# Patient Record
Sex: MALE | Race: WHITE | NOT HISPANIC OR LATINO
[De-identification: names, ages, dates, MRNs, and addresses within clinical notes are randomized per-mention and may not be internally consistent; named-entity substitution may affect disease eponyms.]

---

## 2017-09-06 ENCOUNTER — APPOINTMENT (OUTPATIENT)
Dept: DERMATOLOGY | Facility: CLINIC | Age: 47
End: 2017-09-06
Payer: COMMERCIAL

## 2017-09-06 VITALS — WEIGHT: 235 LBS | HEIGHT: 75 IN | BODY MASS INDEX: 29.22 KG/M2

## 2017-09-06 DIAGNOSIS — F41.9 ANXIETY DISORDER, UNSPECIFIED: ICD-10-CM

## 2017-09-06 DIAGNOSIS — Z80.8 FAMILY HISTORY OF MALIGNANT NEOPLASM OF OTHER ORGANS OR SYSTEMS: ICD-10-CM

## 2017-09-06 PROCEDURE — 99213 OFFICE O/P EST LOW 20 MIN: CPT

## 2018-07-26 PROBLEM — Z80.8 FAMILY HISTORY OF BASAL CELL CARCINOMA: Status: ACTIVE | Noted: 2017-09-06

## 2018-09-14 ENCOUNTER — APPOINTMENT (OUTPATIENT)
Dept: DERMATOLOGY | Facility: CLINIC | Age: 48
End: 2018-09-14
Payer: COMMERCIAL

## 2018-09-14 VITALS — HEIGHT: 75 IN | WEIGHT: 235 LBS | BODY MASS INDEX: 29.22 KG/M2

## 2018-09-14 PROCEDURE — 99213 OFFICE O/P EST LOW 20 MIN: CPT

## 2019-09-13 ENCOUNTER — APPOINTMENT (OUTPATIENT)
Dept: DERMATOLOGY | Facility: CLINIC | Age: 49
End: 2019-09-13
Payer: COMMERCIAL

## 2019-09-13 VITALS — HEIGHT: 75 IN | BODY MASS INDEX: 29.22 KG/M2 | WEIGHT: 235 LBS

## 2019-09-13 DIAGNOSIS — Z00.00 ENCOUNTER FOR GENERAL ADULT MEDICAL EXAMINATION W/OUT ABNORMAL FINDINGS: ICD-10-CM

## 2019-09-13 PROCEDURE — 99213 OFFICE O/P EST LOW 20 MIN: CPT

## 2019-09-13 NOTE — ASSESSMENT
[FreeTextEntry1] : Complete skin examination is negative for malignancy;\par Continue regular exams; Hx BCC

## 2019-09-13 NOTE — HISTORY OF PRESENT ILLNESS
[de-identified] : Pt. presents for skin check;\par No itching, bleeding, growing, changing lesions noted;\par Severity:  mild  \par Modifying factors:  none\par Associated symptoms:  none\par Context:  no association with activity \par

## 2019-09-13 NOTE — PHYSICAL EXAM
[Full Body Skin Exam Performed] : performed [FreeTextEntry3] : Skin examination performed of the face, neck, trunk, arms, legs; \par The patient is well, alert and oriented, pleasant and cooperative.\par Eyelids, conjunctivae, oral mucosa, digits and nails all normal.  \par No cervical adenopathy.\par \par Normal findings include:\par \par Multiple benign nevi were noted. \par Angiomas\par Lentigines\par Healed BCC scar L lateral chest near axilla\par Scaling waxy stuck on papule; L upper back\par \par No lesions were suspicious for malignancy. \par \par

## 2019-12-10 ENCOUNTER — INPATIENT (INPATIENT)
Facility: HOSPITAL | Age: 49
LOS: 1 days | Discharge: HOME | DRG: 347 | End: 2019-12-12
Attending: HOSPITALIST | Admitting: HOSPITALIST
Payer: COMMERCIAL

## 2019-12-10 VITALS
HEART RATE: 60 BPM | DIASTOLIC BLOOD PRESSURE: 73 MMHG | HEIGHT: 75 IN | SYSTOLIC BLOOD PRESSURE: 119 MMHG | OXYGEN SATURATION: 97 % | WEIGHT: 235.89 LBS | RESPIRATION RATE: 18 BRPM | TEMPERATURE: 98 F

## 2019-12-10 DIAGNOSIS — M54.9 DORSALGIA, UNSPECIFIED: ICD-10-CM

## 2019-12-10 LAB
ANION GAP SERPL CALC-SCNC: 4 MMOL/L — LOW (ref 5–17)
BASOPHILS # BLD AUTO: 0.02 K/UL — SIGNIFICANT CHANGE UP (ref 0–0.2)
BASOPHILS NFR BLD AUTO: 0.4 % — SIGNIFICANT CHANGE UP (ref 0–2)
BUN SERPL-MCNC: 24 MG/DL — HIGH (ref 7–23)
CALCIUM SERPL-MCNC: 9 MG/DL — SIGNIFICANT CHANGE UP (ref 8.5–10.1)
CHLORIDE SERPL-SCNC: 108 MMOL/L — SIGNIFICANT CHANGE UP (ref 96–108)
CO2 SERPL-SCNC: 26 MMOL/L — SIGNIFICANT CHANGE UP (ref 22–31)
CREAT SERPL-MCNC: 1.28 MG/DL — SIGNIFICANT CHANGE UP (ref 0.5–1.3)
EOSINOPHIL # BLD AUTO: 0.02 K/UL — SIGNIFICANT CHANGE UP (ref 0–0.5)
EOSINOPHIL NFR BLD AUTO: 0.4 % — SIGNIFICANT CHANGE UP (ref 0–6)
GLUCOSE SERPL-MCNC: 152 MG/DL — HIGH (ref 70–99)
HCT VFR BLD CALC: 47.1 % — SIGNIFICANT CHANGE UP (ref 39–50)
HGB BLD-MCNC: 15.8 G/DL — SIGNIFICANT CHANGE UP (ref 13–17)
IMM GRANULOCYTES NFR BLD AUTO: 0.4 % — SIGNIFICANT CHANGE UP (ref 0–1.5)
LYMPHOCYTES # BLD AUTO: 0.77 K/UL — LOW (ref 1–3.3)
LYMPHOCYTES # BLD AUTO: 14.1 % — SIGNIFICANT CHANGE UP (ref 13–44)
MCHC RBC-ENTMCNC: 30 PG — SIGNIFICANT CHANGE UP (ref 27–34)
MCHC RBC-ENTMCNC: 33.5 GM/DL — SIGNIFICANT CHANGE UP (ref 32–36)
MCV RBC AUTO: 89.4 FL — SIGNIFICANT CHANGE UP (ref 80–100)
MONOCYTES # BLD AUTO: 0.16 K/UL — SIGNIFICANT CHANGE UP (ref 0–0.9)
MONOCYTES NFR BLD AUTO: 2.9 % — SIGNIFICANT CHANGE UP (ref 2–14)
NEUTROPHILS # BLD AUTO: 4.46 K/UL — SIGNIFICANT CHANGE UP (ref 1.8–7.4)
NEUTROPHILS NFR BLD AUTO: 81.8 % — HIGH (ref 43–77)
PLATELET # BLD AUTO: 145 K/UL — LOW (ref 150–400)
POTASSIUM SERPL-MCNC: 4.2 MMOL/L — SIGNIFICANT CHANGE UP (ref 3.5–5.3)
POTASSIUM SERPL-SCNC: 4.2 MMOL/L — SIGNIFICANT CHANGE UP (ref 3.5–5.3)
RBC # BLD: 5.27 M/UL — SIGNIFICANT CHANGE UP (ref 4.2–5.8)
RBC # FLD: 12.1 % — SIGNIFICANT CHANGE UP (ref 10.3–14.5)
SODIUM SERPL-SCNC: 138 MMOL/L — SIGNIFICANT CHANGE UP (ref 135–145)
WBC # BLD: 5.45 K/UL — SIGNIFICANT CHANGE UP (ref 3.8–10.5)
WBC # FLD AUTO: 5.45 K/UL — SIGNIFICANT CHANGE UP (ref 3.8–10.5)

## 2019-12-10 PROCEDURE — 72100 X-RAY EXAM L-S SPINE 2/3 VWS: CPT | Mod: 26

## 2019-12-10 PROCEDURE — 72148 MRI LUMBAR SPINE W/O DYE: CPT

## 2019-12-10 PROCEDURE — 36415 COLL VENOUS BLD VENIPUNCTURE: CPT

## 2019-12-10 PROCEDURE — 93010 ELECTROCARDIOGRAM REPORT: CPT

## 2019-12-10 PROCEDURE — 80048 BASIC METABOLIC PNL TOTAL CA: CPT

## 2019-12-10 PROCEDURE — 72148 MRI LUMBAR SPINE W/O DYE: CPT | Mod: 26

## 2019-12-10 RX ORDER — KETOROLAC TROMETHAMINE 30 MG/ML
60 SYRINGE (ML) INJECTION ONCE
Refills: 0 | Status: DISCONTINUED | OUTPATIENT
Start: 2019-12-10 | End: 2019-12-10

## 2019-12-10 RX ORDER — ACETAMINOPHEN 500 MG
650 TABLET ORAL EVERY 4 HOURS
Refills: 0 | Status: DISCONTINUED | OUTPATIENT
Start: 2019-12-10 | End: 2019-12-12

## 2019-12-10 RX ORDER — EFINACONAZOLE 100 MG/ML
1 SOLUTION TOPICAL
Qty: 0 | Refills: 0 | DISCHARGE

## 2019-12-10 RX ORDER — DIAZEPAM 5 MG
5 TABLET ORAL ONCE
Refills: 0 | Status: DISCONTINUED | OUTPATIENT
Start: 2019-12-10 | End: 2019-12-10

## 2019-12-10 RX ORDER — DEXAMETHASONE 0.5 MG/5ML
10 ELIXIR ORAL ONCE
Refills: 0 | Status: COMPLETED | OUTPATIENT
Start: 2019-12-10 | End: 2019-12-10

## 2019-12-10 RX ORDER — ONDANSETRON 8 MG/1
4 TABLET, FILM COATED ORAL EVERY 6 HOURS
Refills: 0 | Status: DISCONTINUED | OUTPATIENT
Start: 2019-12-10 | End: 2019-12-12

## 2019-12-10 RX ORDER — SENNA PLUS 8.6 MG/1
1 TABLET ORAL AT BEDTIME
Refills: 0 | Status: DISCONTINUED | OUTPATIENT
Start: 2019-12-10 | End: 2019-12-12

## 2019-12-10 RX ORDER — TADALAFIL 10 MG/1
0 TABLET, FILM COATED ORAL
Qty: 0 | Refills: 0 | DISCHARGE

## 2019-12-10 RX ORDER — MORPHINE SULFATE 50 MG/1
4 CAPSULE, EXTENDED RELEASE ORAL ONCE
Refills: 0 | Status: DISCONTINUED | OUTPATIENT
Start: 2019-12-10 | End: 2019-12-10

## 2019-12-10 RX ORDER — SODIUM CHLORIDE 9 MG/ML
1000 INJECTION INTRAMUSCULAR; INTRAVENOUS; SUBCUTANEOUS
Refills: 0 | Status: DISCONTINUED | OUTPATIENT
Start: 2019-12-10 | End: 2019-12-12

## 2019-12-10 RX ORDER — KETOROLAC TROMETHAMINE 30 MG/ML
30 SYRINGE (ML) INJECTION EVERY 6 HOURS
Refills: 0 | Status: DISCONTINUED | OUTPATIENT
Start: 2019-12-10 | End: 2019-12-12

## 2019-12-10 RX ORDER — PANTOPRAZOLE SODIUM 20 MG/1
40 TABLET, DELAYED RELEASE ORAL
Refills: 0 | Status: DISCONTINUED | OUTPATIENT
Start: 2019-12-10 | End: 2019-12-12

## 2019-12-10 RX ORDER — CYCLOBENZAPRINE HYDROCHLORIDE 10 MG/1
5 TABLET, FILM COATED ORAL THREE TIMES A DAY
Refills: 0 | Status: DISCONTINUED | OUTPATIENT
Start: 2019-12-10 | End: 2019-12-12

## 2019-12-10 RX ORDER — HYDROMORPHONE HYDROCHLORIDE 2 MG/ML
0.5 INJECTION INTRAMUSCULAR; INTRAVENOUS; SUBCUTANEOUS
Refills: 0 | Status: DISCONTINUED | OUTPATIENT
Start: 2019-12-10 | End: 2019-12-12

## 2019-12-10 RX ORDER — OXYCODONE AND ACETAMINOPHEN 5; 325 MG/1; MG/1
1 TABLET ORAL ONCE
Refills: 0 | Status: DISCONTINUED | OUTPATIENT
Start: 2019-12-10 | End: 2019-12-10

## 2019-12-10 RX ADMIN — MORPHINE SULFATE 4 MILLIGRAM(S): 50 CAPSULE, EXTENDED RELEASE ORAL at 17:19

## 2019-12-10 RX ADMIN — OXYCODONE AND ACETAMINOPHEN 1 TABLET(S): 5; 325 TABLET ORAL at 15:46

## 2019-12-10 RX ADMIN — OXYCODONE AND ACETAMINOPHEN 1 TABLET(S): 5; 325 TABLET ORAL at 17:19

## 2019-12-10 RX ADMIN — Medication 5 MILLIGRAM(S): at 14:34

## 2019-12-10 RX ADMIN — Medication 60 MILLIGRAM(S): at 14:35

## 2019-12-10 RX ADMIN — Medication 10 MILLIGRAM(S): at 14:37

## 2019-12-10 NOTE — ED STATDOCS - ATTENDING CONTRIBUTION TO CARE
I, Marla Anderson MD,  performed the initial face to face bedside interview with this patient regarding history of present illness, review of symptoms and relevant past medical, social and family history.  I completed an independent physical examination.  I was the initial provider who evaluated this patient. I have signed out the follow up of any pending tests (i.e. labs, radiological studies) to the ACP.  I have communicated the patient’s plan of care and disposition with the ACP.  The history, relevant review of systems, past medical and surgical history, medical decision making, and physical examination was documented by the scribe in my presence and I attest to the accuracy of the documentation.

## 2019-12-10 NOTE — ED STATDOCS - CARE PLAN
Principal Discharge DX:	Low back strain, initial encounter Principal Discharge DX:	Intractable back pain  Secondary Diagnosis:	Low back strain, initial encounter

## 2019-12-10 NOTE — ED ADULT NURSE NOTE - OBJECTIVE STATEMENT
Patient has severe lower back pain. Patient reports that with movement pain increases to a 10/10 and radiates throughout entire body.  Pain is sharp stabbing pain.  No numbness, no weakness in legs.  Patient is using crutches to ambulate with for balance due to spasms.  Patient seen by chiropracter severe muscle spasms.

## 2019-12-10 NOTE — ED ADULT TRIAGE NOTE - CHIEF COMPLAINT QUOTE
Patient comes to ED for lower back pain since yesterday. denies any injury or trauma. pt was seen at chiropractor yesterday.

## 2019-12-10 NOTE — H&P ADULT - NSHPLABSRESULTS_GEN_ALL_CORE
LABS: All Labs Reviewed:                        15.8   5.45  )-----------( 145      ( 10 Dec 2019 17:12 )             47.1     12-10    138  |  108  |  24<H>  ----------------------------<  152<H>  4.2   |  26  |  1.28    Ca    9.0      10 Dec 2019 17:12                Blood Culture:             < from: Xray Lumbosacral Spine (12.10.19 @ 17:14) >      IMPRESSION:   No acute bony pathology of the lumbosacral spine.      Thank you for this referral.    < end of copied text >

## 2019-12-10 NOTE — ED STATDOCS - PROGRESS NOTE DETAILS
48 yo male with no significant PMH presents with b/l lower back pain. 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 States that he had he back go out before but would go to his chiropractor and would feel better. Saw his chiropractor without relief and said that it was a big muscle spasm. Denies weakness or numbness to the LE, incontinence of urine or stools. States he needs to walk with crutches due to the pain. Took motin PM last night and ran out of his medication. Pain meds, Reeval. -Reymundo Fernandez PA-C Pt not feeling better with medications. States he doesn't feel safe going [place] because he fell to the floor.  Will admit and order IV narcotics. -Reymundo Fernandez PA-C

## 2019-12-10 NOTE — H&P ADULT - ASSESSMENT
48 yo M with no pmh presenting with severe lower back pain.  Was carrying heavy books and after trying to  some material and stood up, felt a sudden twinge in his lower back. Pain was progressively worse throughout the day so much so, he needed a cane to ambulate. Sitting made the pain immediately worse and could not lie down for prolonged period of time. At one point, he fell to the ground while using his crutches due to extreme pain. He could not get him self up from that position and endorses that he soiled himself as he could not get up. He never lost control of urine or bowels. No saddle anesthesia. No numbness or tingling or gross motor weakness at any point. he denies trauma or falls. Works out severa ltimes per week however he denies going in last few days and does not lift heavy weights.   Pt also sees a chiropractor however saw him after a month when he threw out his back yesterday.       A:  Intractable back pain suspect paraspinal muscular spasm      P:  Rec'd decadron, valium, toradol, percocet and moprhine in ED  Will start dilaudid for severe pain  Toradol for mod pain  Flexeril for spasm  MRI of L spine  PT consult  DVT px  IMPROVE VTE Individual Risk Assessment    RISK                                                                Points    [  ] Previous VTE                                                  3    [  ] Thrombophilia                                               2    [  ] Lower limb paralysis                                      2        (unable to hold up >15 seconds)      [  ] Current Cancer                                              2         (within 6 months)    [ x ] Immobilization > 24 hrs                                1    [  ] ICU/CCU stay > 24 hours                              1    [  ] Age > 60                                                      1    IMPROVE VTE Score _____1__-> scds    IMPROVE Score 0-1: Low Risk, No VTE prophylaxis required for most patients, encourage ambulation.   IMPROVE Score 2-3: At risk, pharmacologic VTE prophylaxis is indicated for most patients (in the absence of a contraindication)  IMPROVE Score > or = 4: High Risk, pharmacologic VTE prophylaxis is indicated for most patients (in the absence of a contraindication)

## 2019-12-10 NOTE — H&P ADULT - NSHPPHYSICALEXAM_GEN_ALL_CORE
ICU Vital Signs Last 24 Hrs  T(C): 36.7 (10 Dec 2019 13:22), Max: 36.7 (10 Dec 2019 13:22)  T(F): 98 (10 Dec 2019 13:22), Max: 98 (10 Dec 2019 13:22)  HR: 73 (10 Dec 2019 17:18) (60 - 73)  BP: 112/79 (10 Dec 2019 17:18) (112/79 - 119/73)  BP(mean): --  ABP: --  ABP(mean): --  RR: 18 (10 Dec 2019 17:18) (18 - 18)  SpO2: 97% (10 Dec 2019 17:18) (97% - 97%)      PHYSICAL EXAM:    Constitutional: NAD, awake and alert, well-developed  HEENT: PERR, EOMI, Normal Hearing, MMM  Neck: Soft and supple, No LAD, No JVD  Respiratory: Breath sounds are clear bilaterally, No wheezing, rales or rhonchi  Cardiovascular: S1 and S2, regular rate and rhythm, no Murmurs, gallops or rubs  Gastrointestinal: Bowel Sounds present, soft, nontender, nondistended, no guarding, no rebound  Extremities: No peripheral edema  Vascular: 2+ peripheral pulses  Neurological: A/O x 3, no focal deficits  Musculoskeletal: 5/5 strength b/l upper and lower extremities; nl straight leg test; no ttp to vertebrae; paraspinal tenderness noted at L spine region  Skin: No rashes

## 2019-12-10 NOTE — H&P ADULT - NSHPREVIEWOFSYSTEMS_GEN_ALL_CORE
REVIEW OF SYSTEMS:    CONSTITUTIONAL: No weakness, fevers or chills  EYES/ENT: No visual changes;  No vertigo or throat pain   NECK: No pain or stiffness  RESPIRATORY: No cough, wheezing, hemoptysis; No shortness of breath  CARDIOVASCULAR: No chest pain or palpitations  GASTROINTESTINAL: No abdominal or epigastric pain. No nausea, vomiting, or hematemesis; No diarrhea or constipation. No melena or hematochezia.  GENITOURINARY: No dysuria, frequency or hematuria  NEUROLOGICAL: No numbness or weakness; + lumbar back pain R>L  SKIN: No itching, burning, rashes, or lesions   All other review of systems is negative unless indicated above

## 2019-12-10 NOTE — ED STATDOCS - OBJECTIVE STATEMENT
50 y/o M with no PMHx presents to the ED c/o lower back pain beginning yesterday. States that pain becomes so intense he "drop to the floor." Pt took Advil PM last night with no relief in symptoms. Notes that he has had "back problems" in the past, but not this bad. Reports that if he sits down, he has trouble getting back up on his own. Last night, pt attempted to get up from a sitting position but fell to the floor and urinated on himself. Pt saw chiropractor yesterday who said pain was secondary to muscle spasms. Denies numbness, tingling, weakness, injury, or trauma. PMD: Dr. Carrasco.

## 2019-12-10 NOTE — H&P ADULT - HISTORY OF PRESENT ILLNESS
48 yo M with no pmh presenting with severe lower back pain.  Was carrying heavy books and after trying to  some material and stood up, felt a sudden twinge in his lower back. Pain was progressively worse throughout the day so much so, he needed a cane to ambulate. Sitting made the pain immediately worse and could not lie down for prolonged period of time. At one point, he fell to the ground while using his crutches due to extreme pain. He could not get him self up from that position and endorses that he soiled himself as he could not get up. He never lost control of urine or bowels. No saddle anesthesia. No numbness or tingling or gross motor weakness at any point. he denies trauma or falls. Works out severa ltimes per week however he denies going in last few days and does not lift heavy weights.   Pt also sees a chiropractor however saw him after a month when he threw out his back yesterday.     social: no toxic habits  Shx: hernia repair  Fhx: brother with h/o spinal fusion due to traumatic injury

## 2019-12-11 RX ADMIN — SODIUM CHLORIDE 75 MILLILITER(S): 9 INJECTION INTRAMUSCULAR; INTRAVENOUS; SUBCUTANEOUS at 01:12

## 2019-12-11 RX ADMIN — Medication 40 MILLIGRAM(S): at 06:01

## 2019-12-11 RX ADMIN — MORPHINE SULFATE 4 MILLIGRAM(S): 50 CAPSULE, EXTENDED RELEASE ORAL at 01:12

## 2019-12-11 RX ADMIN — PANTOPRAZOLE SODIUM 40 MILLIGRAM(S): 20 TABLET, DELAYED RELEASE ORAL at 06:01

## 2019-12-11 RX ADMIN — HYDROMORPHONE HYDROCHLORIDE 0.5 MILLIGRAM(S): 2 INJECTION INTRAMUSCULAR; INTRAVENOUS; SUBCUTANEOUS at 06:01

## 2019-12-11 RX ADMIN — SENNA PLUS 1 TABLET(S): 8.6 TABLET ORAL at 01:12

## 2019-12-11 RX ADMIN — Medication 60 MILLIGRAM(S): at 01:13

## 2019-12-11 RX ADMIN — SODIUM CHLORIDE 75 MILLILITER(S): 9 INJECTION INTRAMUSCULAR; INTRAVENOUS; SUBCUTANEOUS at 18:51

## 2019-12-11 NOTE — PHYSICAL THERAPY INITIAL EVALUATION ADULT - IMPAIRMENTS FOUND, PT EVAL
ergonomics and body mechanics/truncal mobility mod decreased in FLEX due to acute lower back pain /guarding/spasm/gross motor/ROM/gait, locomotion, and balance/tone

## 2019-12-11 NOTE — PATIENT PROFILE ADULT - NSPROEDAREADYLEARN_GEN_A_NUR
Parent states patient had moderate amount of stool after enema. Patient states he feels better.
none

## 2019-12-11 NOTE — PHYSICAL THERAPY INITIAL EVALUATION ADULT - PATIENT/FAMILY/SIGNIFICANT OTHER GOALS STATEMENT, PT EVAL
pt wants to avoid reinjury,works as teacher in high school ,was lifting heavy books B arms,flexed/rotated and had acute onset pain /debility

## 2019-12-11 NOTE — PHYSICAL THERAPY INITIAL EVALUATION ADULT - LIVES WITH, PROFILE
alone upstairs apartment ,linda is 62 years old ,pt had done snow shoveling in past for her when it snowed/alone

## 2019-12-11 NOTE — PHYSICAL THERAPY INITIAL EVALUATION ADULT - GENERAL OBSERVATIONS, REHAB EVAL
pt observed out of bed in hallway walking upright slowly with crutches B being used as canes (not under arms) pt is tall ,c/o stiffness,reports pain /spasms improved with pain meds given in ED

## 2019-12-11 NOTE — PHYSICAL THERAPY INITIAL EVALUATION ADULT - ACTIVE RANGE OF MOTION EXAMINATION, REHAB EVAL
able to FLEX hips >90degrees with +stiffness lower back/bilateral lower extremity Active ROM was WNL (within normal limits)/bilateral  lower extremity Active ROM was WFL (within functional limits)

## 2019-12-11 NOTE — PROGRESS NOTE ADULT - SUBJECTIVE AND OBJECTIVE BOX
48 yo M with no pmh presenting with severe lower back pain.  Was carrying heavy books and after trying to  some material and stood up, felt a sudden twinge in his lower back. Pain was progressively worse throughout the day so much so, he needed a cane to ambulate. Sitting made the pain immediately worse and could not lie down for prolonged period of time. At one point, he fell to the ground while using his crutches due to extreme pain. He could not get him self up from that position and endorses that he soiled himself as he could not get up. He never lost control of urine or bowels. No saddle anesthesia. No numbness or tingling or gross motor weakness at any point. he denies trauma or falls. Works out severa ltimes per week however he denies going in last few days and does not lift heavy weights.   Pt also sees a chiropractor however saw him after a month when he threw out his back yesterday.  12/11- patient denies fecal or urinary incontinence or retention, denies perineal paresthesia, was, able wto walk with cri=utches today, still hurts to walk but is fbetter, denies dysuria, is afebrile      PHYSICAL EXAM:    	Constitutional: NAD, awake and alert, well-developed  	HEENT: PERR, EOMI, Normal Hearing, MMM  	Neck: Soft and supple, No LAD, No JVD  	Respiratory: Breath sounds are clear bilaterally, No wheezing, rales or rhonchi  	Cardiovascular: S1 and S2, regular rate and rhythm, no Murmurs, gallops or rubs  	Gastrointestinal: Bowel Sounds present, soft, nontender, nondistended, no guarding, no rebound  	Extremities: No peripheral edema  	Vascular: 2+ peripheral pulses  	Neurological: A/O x 3, no focal deficits  	Musculoskeletal: 5/5 strength b/l upper and lower extremities; nl straight leg test; no ttp to vertebrae; paraspinal tenderness noted at L spine region      PHYSICAL EXAM:    Daily     Daily     ICU Vital Signs Last 24 Hrs  T(C): 36.4 (11 Dec 2019 15:41), Max: 37.3 (10 Dec 2019 20:36)  T(F): 97.6 (11 Dec 2019 15:41), Max: 99.2 (10 Dec 2019 20:36)  HR: 90 (11 Dec 2019 15:41) (76 - 90)  BP: 119/65 (11 Dec 2019 15:41) (103/61 - 119/65)  BP(mean): --  ABP: --  ABP(mean): --  RR: 16 (11 Dec 2019 15:41) (16 - 18)  SpO2: 96% (11 Dec 2019 15:41) (94% - 99%)                            15.8   5.45  )-----------( 145      ( 10 Dec 2019 17:12 )             47.1       CBC Full  -  ( 10 Dec 2019 17:12 )  WBC Count : 5.45 K/uL  RBC Count : 5.27 M/uL  Hemoglobin : 15.8 g/dL  Hematocrit : 47.1 %  Platelet Count - Automated : 145 K/uL  Mean Cell Volume : 89.4 fl  Mean Cell Hemoglobin : 30.0 pg  Mean Cell Hemoglobin Concentration : 33.5 gm/dL  Auto Neutrophil # : 4.46 K/uL  Auto Lymphocyte # : 0.77 K/uL  Auto Monocyte # : 0.16 K/uL  Auto Eosinophil # : 0.02 K/uL  Auto Basophil # : 0.02 K/uL  Auto Neutrophil % : 81.8 %  Auto Lymphocyte % : 14.1 %  Auto Monocyte % : 2.9 %  Auto Eosinophil % : 0.4 %  Auto Basophil % : 0.4 %      12-10    138  |  108  |  24<H>  ----------------------------<  152<H>  4.2   |  26  |  1.28    Ca    9.0      10 Dec 2019 17:12                              MEDICATIONS  (STANDING):  pantoprazole    Tablet 40 milliGRAM(s) Oral before breakfast  predniSONE   Tablet 40 milliGRAM(s) Oral daily  senna 1 Tablet(s) Oral at bedtime  sodium chloride 0.9%. 1000 milliLiter(s) (75 mL/Hr) IV Continuous <Continuous>

## 2019-12-11 NOTE — PHYSICAL THERAPY INITIAL EVALUATION ADULT - PLANNED THERAPY INTERVENTIONS, PT EVAL
transfer training/bed mobility training/body mechanics/back protection,ADLs,correction of seated posture/gait training

## 2019-12-11 NOTE — PHYSICAL THERAPY INITIAL EVALUATION ADULT - LEVEL OF INDEPENDENCE: SIT/SUPINE, REHAB EVAL
out of bed to chair after encounter with lumbar roll in place,pt advised against prolonged sitting ,and to change position often,pt is trying to walk hourly to help with stiffness lower back

## 2019-12-11 NOTE — PHYSICAL THERAPY INITIAL EVALUATION ADULT - DIAGNOSIS, PT EVAL
acute lower back pain, lumbar radiculopathy /sciatica r/o HNP acute lower back pain/strain,+DDD/spondylosis L1/2-L5/S1

## 2019-12-11 NOTE — PHYSICAL THERAPY INITIAL EVALUATION ADULT - PERTINENT HX OF CURRENT PROBLEM, REHAB EVAL
carrying heavy books,bent over to  something and felt immediate pain in lower back radiating down leg,inability to ambulate requiring cane or crutches then fell to ground ,unable to get up from ground due to back pain/spasms and soiled himself

## 2019-12-11 NOTE — PATIENT PROFILE ADULT - NSASFALLNEEDSASSIST_GEN_A_NUR
Alert & oriented; no sensory, motor or coordination deficits, normal reflexes negative yes detailed exam

## 2019-12-11 NOTE — PHYSICAL THERAPY INITIAL EVALUATION ADULT - STANDING BALANCE: DYNAMIC, REHAB EVAL
good balance/holding crutches at arms length in front of him,hands resting on axillary pads as canes

## 2019-12-11 NOTE — PHYSICAL THERAPY INITIAL EVALUATION ADULT - REFERRING PHYSICIAN, REHAB EVAL
Dr MICHAEL Alcazar 12/10/19 @1926pm ,to abulate patient with back spasms Dr MICHAEL Alcazar 12/10/19 @1926pm ,to ambulate patient with back spasms

## 2019-12-11 NOTE — ED ADULT NURSE REASSESSMENT NOTE - COMFORT CARE
meal provided
assisted to bathroom/meal provided/warm blanket provided
side rails up/warm blanket provided/darkened lights

## 2019-12-11 NOTE — PROGRESS NOTE ADULT - ASSESSMENT
50 yo M with no pmh presenting with severe lower back pain.  Was carrying heavy books and after trying to  some material and stood up, felt a sudden twinge in his lower back. Pain was progressively worse throughout the day so much so, he needed a cane to ambulate. Sitting made the pain immediately worse and could not lie down for prolonged period of time. At one point, he fell to the ground while using his crutches due to extreme pain. He could not get him self up from that position and endorses that he soiled himself as he could not get up. He never lost control of urine or bowels. No saddle anesthesia. No numbness or tingling or gross motor weakness at any point. he denies trauma or falls. Works out severa ltimes per week however he denies going in last few days and does not lift heavy weights.   Pt also sees a chiropractor however saw him after a month when he threw out his back yesterday.   A:  Intractable back pain suspect paraspinal muscular spasm      P:  Rec'd decadron, valium, toradol, percocet and moprhine in ED  Will start dilaudid for severe pain  prednisone daily  Toradol for mod pain  Flexeril for spasm  MRI of L spine  PT consult  DVT px  IMPROVE VTE Individual Risk AssessmentIMPROVE VTE Score _____1__-> scds

## 2019-12-11 NOTE — ED ADULT NURSE REASSESSMENT NOTE - GENERAL PATIENT STATE
comfortable appearance/denies need for pain medication at this time/resting/sleeping
Patient offered pain medcation refused at this time./comfortable appearance

## 2019-12-12 ENCOUNTER — TRANSCRIPTION ENCOUNTER (OUTPATIENT)
Age: 49
End: 2019-12-12

## 2019-12-12 VITALS
RESPIRATION RATE: 17 BRPM | TEMPERATURE: 98 F | HEART RATE: 71 BPM | SYSTOLIC BLOOD PRESSURE: 109 MMHG | OXYGEN SATURATION: 97 % | DIASTOLIC BLOOD PRESSURE: 67 MMHG

## 2019-12-12 LAB
ANION GAP SERPL CALC-SCNC: 5 MMOL/L — SIGNIFICANT CHANGE UP (ref 5–17)
BUN SERPL-MCNC: 21 MG/DL — SIGNIFICANT CHANGE UP (ref 7–23)
CALCIUM SERPL-MCNC: 8.8 MG/DL — SIGNIFICANT CHANGE UP (ref 8.5–10.1)
CHLORIDE SERPL-SCNC: 109 MMOL/L — HIGH (ref 96–108)
CO2 SERPL-SCNC: 28 MMOL/L — SIGNIFICANT CHANGE UP (ref 22–31)
CREAT SERPL-MCNC: 1.34 MG/DL — HIGH (ref 0.5–1.3)
GLUCOSE SERPL-MCNC: 94 MG/DL — SIGNIFICANT CHANGE UP (ref 70–99)
POTASSIUM SERPL-MCNC: 4.1 MMOL/L — SIGNIFICANT CHANGE UP (ref 3.5–5.3)
POTASSIUM SERPL-SCNC: 4.1 MMOL/L — SIGNIFICANT CHANGE UP (ref 3.5–5.3)
SODIUM SERPL-SCNC: 142 MMOL/L — SIGNIFICANT CHANGE UP (ref 135–145)

## 2019-12-12 RX ORDER — SODIUM CHLORIDE 9 MG/ML
1000 INJECTION INTRAMUSCULAR; INTRAVENOUS; SUBCUTANEOUS
Refills: 0 | Status: DISCONTINUED | OUTPATIENT
Start: 2019-12-12 | End: 2019-12-12

## 2019-12-12 RX ORDER — PANTOPRAZOLE SODIUM 20 MG/1
1 TABLET, DELAYED RELEASE ORAL
Qty: 7 | Refills: 0
Start: 2019-12-12 | End: 2019-12-18

## 2019-12-12 RX ORDER — TADALAFIL 10 MG/1
1 TABLET, FILM COATED ORAL
Qty: 0 | Refills: 0 | DISCHARGE

## 2019-12-12 RX ORDER — CYCLOBENZAPRINE HYDROCHLORIDE 10 MG/1
1 TABLET, FILM COATED ORAL
Qty: 21 | Refills: 0
Start: 2019-12-12 | End: 2019-12-18

## 2019-12-12 RX ADMIN — SODIUM CHLORIDE 100 MILLILITER(S): 9 INJECTION INTRAMUSCULAR; INTRAVENOUS; SUBCUTANEOUS at 14:36

## 2019-12-12 RX ADMIN — CYCLOBENZAPRINE HYDROCHLORIDE 5 MILLIGRAM(S): 10 TABLET, FILM COATED ORAL at 05:31

## 2019-12-12 RX ADMIN — PANTOPRAZOLE SODIUM 40 MILLIGRAM(S): 20 TABLET, DELAYED RELEASE ORAL at 05:10

## 2019-12-12 RX ADMIN — Medication 40 MILLIGRAM(S): at 05:10

## 2019-12-12 NOTE — CONSULT NOTE ADULT - SUBJECTIVE AND OBJECTIVE BOX
[facility]                                                           Orthopedic Spine Consultation    CHIEF COMPLAINT: back pain    HPI: 49 year old male seen walking in room and examined at bedside. Pt c/o excruciating back pain since  4 days. He lifted bags and placed them on the floor. After placing bag he states he twisted his back to the right. He hurt his back after. He had 5 episodes of back pain in the past, but not this severe. Low back pain is constant. Pain is non-radiating. Pt denies lower extremities numbness and weakness. Pt denies bowel and bladder deficits In the past he tried Chiropractic care which would help. He states since admission his pain is improving with medications.       REVIEW OF SYSTEMS:    CONSTITUTIONAL: No fever, weight loss, or fatigue  HEENT: Normal extraoccular movements,   RESPIRATORY: No cough, wheezing, chills or hemoptysis; No shortness of breath  GASTROINTESTINAL: No abdominal or epigastric pain. No nausea, vomiting, or hematemesis; No diarrhea or constipation. No melena or hematochezia.  GENITOURINARY: No dysuria, frequency, hematuria, or incontinence  NEUROLOGICAL: See HPI  MUSCULOSKELETAL: See HPI    Vital Signs Last 24 Hrs  T(C): 36.8 (12 Dec 2019 04:58), Max: 36.8 (12 Dec 2019 04:58)  T(F): 98.2 (12 Dec 2019 04:58), Max: 98.2 (12 Dec 2019 04:58)  HR: 71 (12 Dec 2019 04:58) (71 - 90)  BP: 109/67 (12 Dec 2019 04:58) (98/65 - 119/65)  BP(mean): --  RR: 17 (12 Dec 2019 04:58) (16 - 17)  SpO2: 97% (12 Dec 2019 04:58) (95% - 97%)  I&O's Detail    11 Dec 2019 07:01  -  12 Dec 2019 07:00  --------------------------------------------------------  IN:    Oral Fluid: 480 mL    sodium chloride 0.9%.: 1000 mL  Total IN: 1480 mL    OUT:  Total OUT: 0 mL    Total NET: 1480 mL          LABS:                        15.8   5.45  )-----------( 145      ( 10 Dec 2019 17:12 )             47.1     12-12    142  |  109<H>  |  21  ----------------------------<  94  4.1   |  28  |  1.34<H>    Ca    8.8      12 Dec 2019 06:23            RADIOLOGY & ADDITIONAL STUDIES:    PHYSICAL EXAM:  Constitutional: NAD, well-groomed, well-developed  Extremities:   Vascular: 2+ peripheral pulses  Skin: No rashes  Lumbar: non-tender to palpation of the lumbar spine  Neurological: A/O x 3               Sensation: [X] intact to light touch  [ ] decreased:          Motor exam: [ X ]                   [X] Lower ext.     Hip Flx  Hip Ext   Hip Abd  Hip Add Quad   Hamstrg   TA       EHL      GS                              R        5/5        5/5        5/5             5/5        5/5        5/5        5/5     5/5      5/5                              L         5/5        5/5        5/5             5/5        5/5        5/5        5/5     5/5      5/5                                                      RLE SLR causes back pain  LLE SLR negative    MRI: L1-L2: Degenerative disc and spondylitic changes. Central annular tear   with small disc bulge. Mild central canal narrowing. No significant   foraminal stenosis.   L2-L3: Degenerative disc and spondylitic changes. Mild central canal   stenosis. No significant foraminal narrowing.   L3-L4: Degenerative disc and spondylitic changes. Diffuse disc bulge.   Mild central canal stenosis. No significant foraminal narrowing.   L4-L5: Degenerative disc and spondylitic changes. Asymmetric right sided   annular tear without herniation. No central canal stenosis. Mild   bilateral foraminal narrowing.   L5-S1: Degenerative disc and spondylitic changes. No central canal   stenosis. Mild bilateral foraminal stenosis.            A/P :  Excruciating back pain  - MRI of the lumbar region reviewed with patient.  - Treatment options reviewed with patient such as conservative management, CELESTINO VS facet injection and a consult with a surgeon.  - At this time patient would like to continue conservative management with outpatient physical therapy and medications. Pt deferred on injections at this time. Pain is improving with conservative approach.  - Sign off from Spine stand-point.  - f/u office as outpatient.   - Discussed case with Dr. Meyers.

## 2019-12-12 NOTE — DISCHARGE NOTE PROVIDER - PROVIDER TOKENS
PROVIDER:[TOKEN:[[id number]]],FREE:[LAST:[BEST],PHONE:[(   )    -],FAX:[(   )    -],ADDRESS:[[address]]]

## 2019-12-12 NOTE — DISCHARGE NOTE NURSING/CASE MANAGEMENT/SOCIAL WORK - PATIENT PORTAL LINK FT
You can access the FollowMyHealth Patient Portal offered by [facility] by registering at the following website: [email or web address]. By joining [facility]’s FollowMyHealth portal, you will also be able to view your health information using other applications (apps) compatible with our system.

## 2019-12-12 NOTE — DISCHARGE NOTE PROVIDER - NSDCMRMEDTOKEN_GEN_ALL_CORE_FT
cyclobenzaprine 5 mg oral tablet: 1 tab(s) orally 3 times a day, As needed, Muscle Spasm  Jublia 10% topical solution: Apply topically to affected area once a day to toe  Multiple Vitamins oral tablet: 1 tab(s) orally once a day  pantoprazole 40 mg oral delayed release tablet: 1 tab(s) orally once a day (before a meal)  predniSONE 20 mg oral tablet: 2 tab(s) orally once a day  tramadol-acetaminophen 37.5 mg-325 mg oral tablet: 1 tab(s) orally every 6 hours MDD:4 tabs

## 2019-12-12 NOTE — DISCHARGE NOTE PROVIDER - NSDCCPCAREPLAN_GEN_ALL_CORE_FT
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 and L5S1 veRtebrae  PLAN TO TO CONTINUE PREDNISONE AND FLEXERIL AS PRESCRIBED AND FOLLOW UP WITH DR LARIOS OFFICE 1 WEEK  AND PHYSICAL THERAPY  1 WEEK  ALSO PLEASE FOLLOW UP WITH YOUR PRIMARY DOCTOR TO CHECK YOUR KIDNEY FUNCTION .YOUR SERUM CREATININE IS 1.34 WHICH INCREASED FROM 1.2 ON ADMISSION WHICH SI LIKELY DUE TO USE OF PAIN MEDS LIKE TORADOL AND MOTRIN  IT WAS TREATED WITH INTRAVENOUS HYDRATION AND HOLDING OFF THE NONSTEROIDAL ANTIINFLAMMATORY AGENTS  PLEASE HAVE YOUR PRIMARY DOCTOR CHECK BASIC METABOLIC PANEL FOR KIDNEY FUNCTION STABILITY IN 1 WEEK      SECONDARY DISCHARGE DIAGNOSES  Diagnosis: Low back strain, initial encounter  Assessment and Plan of Treatment:

## 2019-12-12 NOTE — DISCHARGE NOTE PROVIDER - HOSPITAL COURSE
48 yo M with no pmh presenting with severe lower back pain after carrying heavy books and stood up, felt a sudden twinge in his lower back. Pain was progressively worse throughout the day so much so, he needed a cane to ambulate. Sitting made the pain immediately worse and could not lie down for prolonged period of time. At one point, he fell to the ground while using his crutches due to extreme pain. He could not get him self up from that position and endorses that he soiled himself as he could not get up. He never lost control of urine or bowels. No saddle anesthesia. No numbness or tingling or gross motor weakness at any point. he denies  prior trauma or falls. Works out severa ltimes per week however he denies going in last few days and does not lift heavy weights.     He  saw  chiropractor as outpatient  1 day prior to admission  when he threw out his back         Patient was admitted for Acute/intractable  lower back pain with difficulty ambulating         A:    Acute/intractable  lower back pain with difficulty ambulating     MRI showed degenerative lumbar L1L2, L5S1 DISEASE    ruled out acute cord compression    treated with 3 days of oral steroids and pain medications which improved his pain and now is able to ambulate with crutches due to pain    no motor weakness, no cauda equina symptoms    patientevaluated by orthos spine - no acute surgical intervention recommended    Plan is to discharge on 4 more days of prednisone 40mg daily, with flexeril 5mg TID  and tramadol prn     WILL AVOID NSAIDS FOR NOW AS SERUM CREATININE SLIGHTLY WORSENED FROM 1.2 ON ADMISSION TO 1.34 LIKELY DEU TO NSAID USE    PATIENT RECEIVED IV HYDRATION -PLAN IS TO FOLLOW UP WITH PCP FOR BMP CHECK IN 1 WEEK TO CHECK STABILITY    F/U WITH ORTHOSPINE AS OUTPATIENT dR LARIOS AND  AND SCRIPT  GIVEN TO PATIENT FOR PHYSICAL THERAPY REFERAL  2 TO3 TIMES A WEEK AS OUTPATIENT                     12/12- patient denies fecal or urinary incontinence or retention, denies perineal paresthesia,ABLE TO WALK BETTER WITH CRUTCHES AS IT HELPS WITH THE LOWER BACK PAIN, DENIES MOTOR WEAKNESS           PHYSICAL EXAM:        	Constitutional: NAD, awake and alert, well-developed    	HEENT: PERR, EOMI, Normal Hearing, MMM    	Neck: Soft and supple, No LAD, No JVD    	Respiratory: Breath sounds are clear bilaterally, No wheezing, rales or rhonchi    	Cardiovascular: S1 and S2, regular rate and rhythm, no Murmurs, gallops or rubs    	Gastrointestinal: Bowel Sounds present, soft, nontender, nondistended, no guarding, no rebound    	Extremities: No peripheral edema    	Neurological: A/O x 3, no focal deficits    	Musculoskeletal: 5/5 strength b/l upper and lower extremities; nl straight leg test; no ttp to vertebrae; paraspinal tenderness noted at L spine region            DISCHARGE TIME SPENT 47MINS    I DISCUSSED WITH PATIENT AND RN TEAM AND ORTHOSPINE CONSULT APPRECIATED

## 2019-12-17 DIAGNOSIS — M47.817 SPONDYLOSIS WITHOUT MYELOPATHY OR RADICULOPATHY, LUMBOSACRAL REGION: ICD-10-CM

## 2019-12-17 DIAGNOSIS — M62.830 MUSCLE SPASM OF BACK: ICD-10-CM

## 2020-06-17 NOTE — DISCHARGE NOTE PROVIDER - CARE PROVIDER_API CALL
per EP RN written instructions
Yuriy Meyers (DO)  Orthopaedic Surgery  [address] [place] [address], [address] [place] [address]  Phone: [phone]  Fax: [phone]  Follow Up Time:     BEST,   dR BEST  Phone: (   )    -  Fax: (   )    -  Follow Up Time:

## 2020-09-16 ENCOUNTER — APPOINTMENT (OUTPATIENT)
Dept: DERMATOLOGY | Facility: CLINIC | Age: 50
End: 2020-09-16

## 2022-02-10 PROBLEM — Z78.9 OTHER SPECIFIED HEALTH STATUS: Chronic | Status: ACTIVE | Noted: 2019-12-13

## 2022-04-09 ENCOUNTER — NON-APPOINTMENT (OUTPATIENT)
Age: 52
End: 2022-04-09

## 2022-04-12 ENCOUNTER — APPOINTMENT (OUTPATIENT)
Dept: DERMATOLOGY | Facility: CLINIC | Age: 52
End: 2022-04-12
Payer: COMMERCIAL

## 2022-04-12 PROCEDURE — 99213 OFFICE O/P EST LOW 20 MIN: CPT

## 2022-05-04 NOTE — DISCHARGE NOTE NURSING/CASE MANAGEMENT/SOCIAL WORK - NSPROEXTENSIONSOFSELF_GEN_A_NUR
crutches/none Alternatives Discussed Intro (Do Not Add Period): I discussed alternative treatments to Mohs surgery and specifically discussed the risks and benefits of topical treatment as well as

## 2023-01-01 NOTE — DISCHARGE NOTE PROVIDER - NSDCCONDITION_GEN_ALL_CORE
EXAMINATION: Abdomen 1 view



HISTORY: Abdominal pain



COMPARISON: None available.



FINDINGS: 



There is a moderate amount of gas and stool throughout the colon.

Nonobstructive bowel gas pattern. No radiopaque foreign body. The

lung bases are clear. The osseous structures are intact.



IMPRESSION:



Moderate stool burden without other acute abnormality in the

abdomen.



Dictated by: 



  Dictated on workstation # [id number]
Stable

## 2023-01-17 NOTE — ED ADULT NURSE NOTE - EXTENSIONS OF SELF_ADULT
Continue Regimen: Qbrexa 2.4% towelettes daily prn.  I told her she can cut the towelettes in half to get twice as many.  She can also decrease to every other day to see if she maintains control. Plan: She denies side effects and would like to continue the qbrexza.  Will plan to continue.  She has some other medical issues that she had before the qbrexza,  she has seen a neurologist and has been referred to a psychiatrist. Detail Level: Zone None

## 2023-04-11 ENCOUNTER — APPOINTMENT (OUTPATIENT)
Dept: DERMATOLOGY | Facility: CLINIC | Age: 53
End: 2023-04-11
Payer: COMMERCIAL

## 2023-04-11 DIAGNOSIS — B35.3 TINEA PEDIS: ICD-10-CM

## 2023-04-11 DIAGNOSIS — Z85.828 PERSONAL HISTORY OF OTHER MALIGNANT NEOPLASM OF SKIN: ICD-10-CM

## 2023-04-11 DIAGNOSIS — D22.9 MELANOCYTIC NEVI, UNSPECIFIED: ICD-10-CM

## 2023-04-11 DIAGNOSIS — B35.1 TINEA UNGUIUM: ICD-10-CM

## 2023-04-11 DIAGNOSIS — D18.01 HEMANGIOMA OF SKIN AND SUBCUTANEOUS TISSUE: ICD-10-CM

## 2023-04-11 DIAGNOSIS — Z12.83 ENCOUNTER FOR SCREENING FOR MALIGNANT NEOPLASM OF SKIN: ICD-10-CM

## 2023-04-11 DIAGNOSIS — L82.1 OTHER SEBORRHEIC KERATOSIS: ICD-10-CM

## 2023-04-11 DIAGNOSIS — L73.8 OTHER SPECIFIED FOLLICULAR DISORDERS: ICD-10-CM

## 2023-04-11 PROCEDURE — 99214 OFFICE O/P EST MOD 30 MIN: CPT

## 2023-04-11 RX ORDER — CICLOPIROX 80 MG/ML
8 SOLUTION TOPICAL
Qty: 1 | Refills: 6 | Status: ACTIVE | COMMUNITY
Start: 2023-04-11 | End: 1900-01-01

## 2023-04-11 RX ORDER — KETOCONAZOLE 20 MG/G
2 CREAM TOPICAL
Qty: 1 | Refills: 4 | Status: ACTIVE | COMMUNITY
Start: 2023-04-11 | End: 1900-01-01

## 2023-11-20 NOTE — ED ADULT NURSE NOTE - DISTAL EXTREMITY CAPILLARY REFILL
Detail Level: Detailed
Quality 226: Preventive Care And Screening: Tobacco Use: Screening And Cessation Intervention: Patient screened for tobacco use and is an ex/non-smoker
2 seconds or less

## 2024-04-18 ENCOUNTER — APPOINTMENT (OUTPATIENT)
Dept: DERMATOLOGY | Facility: CLINIC | Age: 54
End: 2024-04-18

## 2024-05-28 NOTE — ED STATDOCS - MUSCULOSKELETAL, MLM
Patient Call    Who are you speaking with? Child    If it is not the patient, are they listed on an active communication consent form? No   What is the reason for this call? Pts daughter is calling in regards to pts pain medication that was sent to [facility] from palliative care. The pharmacy states they do not have the medication and can't get it for 2-3 days. Pt is  out of the medication as of today. Pts daughter is asking if Dr Guadarrama can send the medication to a different pharmacy for pt? During call, call was dropped. I attempted to call pts daughter back, no answer, voicemail left for pts daughter to call [facility] back.    Does this require a call back? Yes   If a call back is required, please list best call back number [phone]   If a call back is required, advise that a message will be forwarded to their care team and someone will return their call as soon as possible.   Did you relay this information to the patient? Yes      range of motion is not limited and there is no muscle tenderness.

## 2025-08-04 ENCOUNTER — APPOINTMENT (OUTPATIENT)
Dept: DERMATOLOGY | Facility: CLINIC | Age: 55
End: 2025-08-04
Payer: COMMERCIAL

## 2025-08-04 PROCEDURE — 99213 OFFICE O/P EST LOW 20 MIN: CPT | Mod: 25

## 2025-08-04 PROCEDURE — 17000 DESTRUCT PREMALG LESION: CPT
